# Patient Record
Sex: FEMALE | Race: WHITE | NOT HISPANIC OR LATINO | Employment: OTHER | RURAL
[De-identification: names, ages, dates, MRNs, and addresses within clinical notes are randomized per-mention and may not be internally consistent; named-entity substitution may affect disease eponyms.]

---

## 2020-08-05 ENCOUNTER — TRANSCRIBE ORDERS (OUTPATIENT)
Dept: PHYSICAL THERAPY | Facility: CLINIC | Age: 80
End: 2020-08-05

## 2020-08-05 DIAGNOSIS — M70.71 BURSITIS OF RIGHT HIP, UNSPECIFIED BURSA: Primary | ICD-10-CM

## 2020-08-12 ENCOUNTER — TREATMENT (OUTPATIENT)
Dept: PHYSICAL THERAPY | Facility: CLINIC | Age: 80
End: 2020-08-12

## 2020-08-12 DIAGNOSIS — M70.71 BURSITIS OF RIGHT HIP, UNSPECIFIED BURSA: Primary | ICD-10-CM

## 2020-08-12 DIAGNOSIS — M25.551 RIGHT HIP PAIN: ICD-10-CM

## 2020-08-12 PROCEDURE — 97110 THERAPEUTIC EXERCISES: CPT | Performed by: PHYSICAL THERAPIST

## 2020-08-12 PROCEDURE — 97162 PT EVAL MOD COMPLEX 30 MIN: CPT | Performed by: PHYSICAL THERAPIST

## 2020-08-12 NOTE — PROGRESS NOTES
Physical Therapy Initial Evaluation and Plan of Care      Patient: Katie Ayoub   : 1940  Diagnosis/ICD-10 Code:  Bursitis of right hip, unspecified bursa [M70.71]  Referring practitioner: Kandi Joshi NP  Date of Initial Visit: 2020  Today's Date: 2020  Patient seen for 1 sessions    Next MD appt: 2020.  Recertification: 2020          Subjective Questionnaire: LEFS: 32/80      Subjective Evaluation    History of Present Illness  Date of surgery: 2019.    Subjective comment: patient reports she had a HIRA in 2019. She reports she was doing good until about 2 months ago. She reprots her HIRA was an anterior approach. She reprots she avoids steps and getitng in out of the bath. She reports she is unable to sleep on her R side and here lately it is waking her up. She reports she gets twinges of pain in the R hip too.   Patient Occupation: Unemployed- Retired from [employer] Quality of life: good    Pain  Current pain ratin  At best pain ratin  At worst pain ratin  Location: R hip  Quality: discomfort (soreness)  Relieving factors: change in position  Aggravating factors: prolonged positioning and ambulation    Social Support  Lives in: one-story house  Lives with: spouse    Diagnostic Tests  X-ray: normal    Treatments  Previous treatment: physical therapy  Patient Goals  Patient goals for therapy: decreased pain  Patient goal: Patient wishes to be able to sleep on her R side.           Objective          Tenderness     Right Hip   Tenderness in the greater trochanter.     Neurological Testing     Sensation     Hip   Left Hip   Intact: light touch    Right Hip   Intact: light touch    Passive Range of Motion     Right Hip   Normal passive range of motion    Strength/Myotome Testing     Left Hip   Planes of Motion   Flexion: 4-  Abduction: 4  Adduction: 4    Right Hip   Planes of Motion   Flexion: 4  Abduction: 4  Adduction: 4    Left Knee   Flexion:  "4+  Extension: 4+    Right Knee   Flexion: 4+  Extension: 4+    Tests     Right Hip   Positive LANA.   Negative FADIR, modified Patricia, Patricia and piriformis.   Keaton: Negative.    Modified Keaton: Negative.     Right Hip Flexibility Comments:   Mild ITB tightness.    Ambulation   Weight-Bearing Status   Weight-Bearing Status (Left): weight-bearing as tolerated   Weight-Bearing Status (Right): weight-bearing as tolerated   Assistive device used: none    Ambulation: Level Surfaces   Ambulation without assistive device: independent    Ambulation: Stairs     Additional Stairs Ambulation Details  Not tested.    Observational Gait   Gait: asymmetric   Decreased walking speed and stride length.   Left foot contact pattern: heel to toe  Right foot contact pattern: heel to toe          Assessment & Plan     Assessment  Impairments: abnormal or restricted ROM, activity intolerance, impaired physical strength, lacks appropriate home exercise program and pain with function  Assessment details: Patient presents in a deconditioned state with complaints of R lateral hip pain and tightness. She is >18 months out from a R anterior HIRA. Her main goal is to lay on her side. Her order has dry needling on it but patient defers it at this time. Patient did well with all HEP exercises today. Patient was given written copies of HEP.  Prognosis: good  Prognosis details: Barriers to Rehab: Include significant or possible arthritic/degenerative changes that have occurred within the joint, The chronicity of this issue, The patient's obesity, The patient's generally deconditioned state.    Safety Issues: None noted.    Functional Limitations: lifting, walking, uncomfortable because of pain, moving in bed, sitting, standing and stooping  Goals  Plan Goals: Short Term Goals  1) Patient I with HEP and have additions/changes by next recertification.    2) Patient able to perform 20 Bridges with UE \"X\" with no increase in pain.    3) R hip flexion >= " 4/5.    4) Patient able to perform sit to/from stand with 1 UE A x20 reps, = WB B LEs.    5) B hip add >= 4+/5    6) Patient able to ambulate with good heel/toe gait cycle, = step/stride lengths with no assistive device >= 300' and no increase in pain.    Long Term Goals  1) B hip >= 4+/5 in all directions.    2) B Quads 5/5.    3) B hamstrings 5/5.    4)Minimal to no TTP at the R ITB and GTB.    5) Patient able to ambulate 1/8 mile non-antalgically with no significant gait deviations and no increase in pain.    6) Patient able to ambulate up/down 3 steps reciprocally 1 HRA for balance with no increase in pain x10.    7) I with final HEP      Plan  Therapy options: will be seen for skilled physical therapy services  Planned modality interventions: cryotherapy and high voltage pulsed current (pain management)  Planned therapy interventions: balance/weight-bearing training, dressing changes, flexibility, functional ROM exercises, gait training, home exercise program, IADL retraining, joint mobilization, manual therapy, postural training, soft tissue mobilization, strengthening, stretching, therapeutic activities and transfer training  Duration in visits: 10  Treatment plan discussed with: patient  Plan details: Progress overall ROM, strength, gait, function, and flexibility.      Other therapeutic activities and/or exercises will be prescribed depending on the patients progress or lack there of.     Visit Diagnoses:    ICD-10-CM ICD-9-CM   1. Bursitis of right hip, unspecified bursa M70.71 726.5   2. Right hip pain M25.551 719.45       Timed:  Manual Therapy:         mins  16187;  Therapeutic Exercise:    10     mins  76451;     Aquatic Ther Ex:         mins  19420;     Neuromuscular Maria L:        mins  27144;    Therapeutic Activity:          mins  72788;     Gait Training:           mins  79204;     Ultrasound:          mins  51028;    Paraffin:        mins  44656;  Electrical Stimulation:         mins  08103 (  );    Untimed:  Electrical Stimulation:         mins  54526 ( );  Mechanical Traction:         mins  98593;     Timed Treatment:   10   mins   Total Treatment:     35   mins    PT SIGNATURE: Kay Witt, REINA DPT, [facility]   DATE TREATMENT INITIATED: 8/12/2020    Initial Certification  Certification Period: 11/10/2020  I certify that the therapy services are furnished while this patient is under my care.  The services outlined above are required by this patient, and will be reviewed every 90 days.     PHYSICIAN: Kandi Joshi, NP      DATE:     Please sign and return via fax to  .. Thank you, [facility].

## 2020-08-13 ENCOUNTER — TREATMENT (OUTPATIENT)
Dept: PHYSICAL THERAPY | Facility: CLINIC | Age: 80
End: 2020-08-13

## 2020-08-13 DIAGNOSIS — M70.71 BURSITIS OF RIGHT HIP, UNSPECIFIED BURSA: Primary | ICD-10-CM

## 2020-08-13 DIAGNOSIS — M25.551 RIGHT HIP PAIN: ICD-10-CM

## 2020-08-13 PROCEDURE — 97110 THERAPEUTIC EXERCISES: CPT | Performed by: PHYSICAL THERAPIST

## 2020-08-13 NOTE — PROGRESS NOTES
"Physical Therapy Daily Progress Note    Patient: Katie Ayoub   : 1940  Diagnosis/ICD-10 Code:     Diagnosis Plan   1. Bursitis of right hip, unspecified bursa     2. Right hip pain       Referring practitioner: Kandi Joshi NP  Date of Initial Visit: Type: THERAPY  Noted: 2020  Today's Date: 2020  Patient seen for 2 sessions      PT Recheck Due: 2020  PT MD Visit: 2020       Katie Ayoub     Subjective Questionnaire:       Subjective Evaluation    History of Present Illness    Subjective comment: Pt reports she is a little sore from the exercises last visit but no pain to report.  States the soreness is just from doing nothing for so long.  Pain  Current pain ratin             Objective   See Exercise, Manual, and Modality Logs for complete treatment.       Assessment & Plan     Assessment  Assessment details: Pt with good effort and fair technique.  Requires frequent cues for technique and form with cues for slow and controlled motions.  No reports of pain with any exercise or movements today.  Fatigued with sit to stands.  Gait observation with limited heel strike and limited pelvic rotation.  Struggles with ITB stretch.  Reinforced form and no new additions to HEP at this time until comfortable with current HEP.      Goals  Plan Goals: Short Term Goals  1) Patient I with HEP and have additions/changes by next recertification.    2) Patient able to perform 20 Bridges with UE \"X\" with no increase in pain.    3) R hip flexion >= 4/5.    4) Patient able to perform sit to/from stand with 1 UE A x20 reps, = WB B LEs.    5) B hip add >= 4+/5    6) Patient able to ambulate with good heel/toe gait cycle, = step/stride lengths with no assistive device >= 300' and no increase in pain.    Long Term Goals  1) B hip >= 4+/5 in all directions.    2) B Quads 5/5.    3) B hamstrings 5/5.    4)Minimal to no TTP at the R ITB and GTB.    5) Patient able to ambulate 1/8 mile non-antalgically " with no significant gait deviations and no increase in pain.    6) Patient able to ambulate up/down 3 steps reciprocally 1 HRA for balance with no increase in pain x10.    7) I with final HEP    Plan  Plan details: Continue with strength and ROM.  Next visit add standing hip strengthening exercises if tolerated.  Review/modify ITB st if needed.        Progress per Plan of Care and Progress strengthening /stabilization /functional activity            Timed:  Manual Therapy:         mins  62721;  Therapeutic Exercise:    48     mins  67974;   Aquatic Therex :        mins  13899    Neuromuscular Maria L:        mins  16735;    Therapeutic Activity:          mins  08117;     Gait Training:           mins  35067;     Ultrasound:          mins  80023;    Electrical Stimulation:         mins  24653 ( );    Untimed:  Electrical Stimulation:         mins  36240 ( );  Mechanical Traction:         mins  36635;   Orthotic fit/train:         mins  90875    Timed Treatment:   48   mins   Total Treatment:     48   mins  Janey Watts PTA  Physical Therapist Assistant

## 2020-08-18 ENCOUNTER — TREATMENT (OUTPATIENT)
Dept: PHYSICAL THERAPY | Facility: CLINIC | Age: 80
End: 2020-08-18

## 2020-08-18 DIAGNOSIS — M70.71 BURSITIS OF RIGHT HIP, UNSPECIFIED BURSA: Primary | ICD-10-CM

## 2020-08-18 DIAGNOSIS — M25.551 RIGHT HIP PAIN: ICD-10-CM

## 2020-08-18 PROCEDURE — 97110 THERAPEUTIC EXERCISES: CPT | Performed by: PHYSICAL THERAPIST

## 2020-08-18 NOTE — PROGRESS NOTES
"   Physical Therapy Daily Progress Note      Patient: Katie Ayoub   : 1940  Referring practitioner: Kandi Joshi NP  Date of Initial Visit: Type: THERAPY  Noted: 2020  Today's Date: 2020  Patient seen for 3 sessions    Next MD appt: 2020.  Recertification: 2020        Katie Ayoub reports: she is unsure of any improvements yet.        Subjective Evaluation    Pain  No pain reported           Objective   See Exercise, Manual, and Modality Logs for complete treatment.       Assessment & Plan     Assessment  Assessment details: Patient still exhibits foot flat type gait with stiff waddling type gait with limited hip movement. She also ambulates with a wider AYAD on the L LE, R more in line with body. Still no changes t HEP. Reviewed ITB S and better instructed it for patient ot better understand for home. Patient reported she had not been doing the ITB S at home. No issues with new there ex. Continues to fatigue easily.     Goals  Plan Goals: Short Term Goals  1) Patient I with HEP and have additions/changes by next recertification.    2) Patient able to perform 20 Bridges with UE \"X\" with no increase in pain.    3) R hip flexion >= 4/5.    4) Patient able to perform sit to/from stand with 1 UE A x20 reps, = WB B LEs. (ongoing/progressing)    5) B hip add >= 4+/5    6) Patient able to ambulate with good heel/toe gait cycle, = step/stride lengths with no assistive device >= 300' and no increase in pain.    Long Term Goals  1) B hip >= 4+/5 in all directions.    2) B Quads 5/5.    3) B hamstrings 5/5.    4)Minimal to no TTP at the R ITB and GTB.    5) Patient able to ambulate 1/8 mile non-antalgically with no significant gait deviations and no increase in pain.    6) Patient able to ambulate up/down 3 steps reciprocally 1 HRA for balance with no increase in pain x10.    7) I with final HEP    Plan  Plan details: Add step up Fwd/Lat next session. Work on fixing/smoothing " gait.        Visit Diagnoses:    ICD-10-CM ICD-9-CM   1. Bursitis of right hip, unspecified bursa M70.71 726.5   2. Right hip pain M25.551 719.45                  Timed:  Manual Therapy:         mins  66909;  Therapeutic Exercise:    54     mins  15109;     Aquatic Ther Ex:         mins  00976;     Neuromuscular Maria L:        mins  37473;    Therapeutic Activity:          mins  98204;     Gait Training:           mins  29534;     Ultrasound:          mins  81624;    Paraffin:        mins  83477;  Electrical Stimulation:         mins  08516 ( );    Untimed:  Electrical Stimulation:         mins  55094 ( );  Mechanical Traction:         mins  43224;     Timed Treatment:   54   mins   Total Treatment:     54   mins    Kay Witt PT DPT, CSCS  Physical Therapist

## 2020-08-20 ENCOUNTER — TREATMENT (OUTPATIENT)
Dept: PHYSICAL THERAPY | Facility: CLINIC | Age: 80
End: 2020-08-20

## 2020-08-20 DIAGNOSIS — M70.71 BURSITIS OF RIGHT HIP, UNSPECIFIED BURSA: Primary | ICD-10-CM

## 2020-08-20 DIAGNOSIS — M25.551 RIGHT HIP PAIN: ICD-10-CM

## 2020-08-20 PROCEDURE — 97110 THERAPEUTIC EXERCISES: CPT | Performed by: PHYSICAL THERAPIST

## 2020-08-20 NOTE — PROGRESS NOTES
"   Physical Therapy Daily Progress Note      Patient: Katie Ayoub   : 1940  Referring practitioner: Kandi Joshi NP  Date of Initial Visit: Type: THERAPY  Noted: 2020  Today's Date: 2020  Patient seen for 4 sessions    Next MD appt: 2020.  Recertification: 2020        Katie Ayoub reports: unsure of imporovements      Subjective Evaluation    History of Present Illness    Subjective comment: Patient reprots she is sore in her legs.       Objective   See Exercise, Manual, and Modality Logs for complete treatment.       Assessment & Plan     Assessment  Assessment details: Patient arrived 15 min late today. Still requires cueing for proper technique with St. ITB S. Patient reports doing HEP, \"a little\". Did add some st. Ther ex to HEP. No complaints with new there ex other than fatigue.     Goals  Plan Goals: Short Term Goals  1) Patient I with HEP and have additions/changes by next recertification.    2) Patient able to perform 20 Bridges with UE \"X\" with no increase in pain.    3) R hip flexion >= 4/5.    4) Patient able to perform sit to/from stand with 1 UE A x20 reps, = WB B LEs. (ongoing/progressing)    5) B hip add >= 4+/5    6) Patient able to ambulate with good heel/toe gait cycle, = step/stride lengths with no assistive device >= 300' and no increase in pain.    Long Term Goals  1) B hip >= 4+/5 in all directions.    2) B Quads 5/5.    3) B hamstrings 5/5.    4)Minimal to no TTP at the R ITB and GTB.    5) Patient able to ambulate 1/8 mile non-antalgically with no significant gait deviations and no increase in pain.    6) Patient able to ambulate up/down 3 steps reciprocally 1 HRA for balance with no increase in pain x10.    7) I with final HEP    Plan  Plan details: Work on fixing/smoothing gait. Add lateral step up        Visit Diagnoses:    ICD-10-CM ICD-9-CM   1. Bursitis of right hip, unspecified bursa M70.71 726.5   2. Right hip pain M25.551 719.45       Progress " strengthening /stabilization /functional activity           Timed:  Manual Therapy:         mins  21822;  Therapeutic Exercise:    33     mins  11902;     Aquatic Ther Ex:         mins  35665;     Neuromuscular Maria L:        mins  81512;    Therapeutic Activity:          mins  69241;     Gait Training:           mins  10828;     Ultrasound:          mins  94115;    Paraffin:        mins  50205;  Electrical Stimulation:         mins  88143 ( );    Untimed:  Electrical Stimulation:         mins  50449 ( );  Mechanical Traction:         mins  55175;     Timed Treatment:   33   mins   Total Treatment:     33   mins    Kay Witt PT DPT, CSCS  Physical Therapist

## 2020-08-25 ENCOUNTER — TREATMENT (OUTPATIENT)
Dept: PHYSICAL THERAPY | Facility: CLINIC | Age: 80
End: 2020-08-25

## 2020-08-25 DIAGNOSIS — M70.71 BURSITIS OF RIGHT HIP, UNSPECIFIED BURSA: Primary | ICD-10-CM

## 2020-08-25 DIAGNOSIS — M25.551 RIGHT HIP PAIN: ICD-10-CM

## 2020-08-25 PROCEDURE — 97110 THERAPEUTIC EXERCISES: CPT | Performed by: PHYSICAL THERAPIST

## 2020-08-25 NOTE — PROGRESS NOTES
"   Physical Therapy Daily Progress Note      Patient: Katie Ayoub   : 1940  Referring practitioner: Kandi Joshi NP  Date of Initial Visit: Type: THERAPY  Noted: 2020  Today's Date: 2020  Patient seen for 5 sessions    Next MD appt: 2020.  Recertification: 2020        Katie Ayoub reports: \"cant tell\"        Subjective Evaluation    History of Present Illness    Subjective comment: pt states that she isnt huring this morning just sore through the lateral/posterior hipPain  Pain scale: \"sore\"           Objective          Ambulation     Comments   Waddling gait      See Exercise, Manual, and Modality Logs for complete treatment.       Assessment & Plan     Assessment  Assessment details: Pt did well with treatment-  No complaints of pain throughout. Pt only required 1-2 seated rest breaks. Pt displayed good effort throughout.    Goals  Plan Goals: Short Term Goals  1) Patient I with HEP and have additions/changes by next recertification.    2) Patient able to perform 20 Bridges with UE \"X\" with no increase in pain.    3) R hip flexion >= 4/5.    4) Patient able to perform sit to/from stand with 1 UE A x20 reps, = WB B LEs. (ongoing/progressing)    5) B hip add >= 4+/5    6) Patient able to ambulate with good heel/toe gait cycle, = step/stride lengths with no assistive device >= 300' and no increase in pain.    Long Term Goals  1) B hip >= 4+/5 in all directions.    2) B Quads 5/5.    3) B hamstrings 5/5.    4)Minimal to no TTP at the R ITB and GTB.    5) Patient able to ambulate 1/8 mile non-antalgically with no significant gait deviations and no increase in pain.    6) Patient able to ambulate up/down 3 steps reciprocally 1 HRA for balance with no increase in pain x10.    7) I with final HEP        Plan  Plan details: Lateral side stepping with tband above knees in // bars. See if pt can do HL/supine piriformis S        Visit Diagnoses:    ICD-10-CM ICD-9-CM   1. Bursitis of right " hip, unspecified bursa M70.71 726.5   2. Right hip pain M25.551 719.45       Progress per Plan of Care and Progress strengthening /stabilization /functional activity           Timed:  Manual Therapy:         mins  59851;  Therapeutic Exercise:    45     mins  76697;     Neuromuscular Maria L:        mins  09299;    Therapeutic Activity:          mins  79442;     Gait Training:           mins  17275;     Ultrasound:          mins  79523;    Electrical Stimulation:         mins  65206 ( );    Untimed:  Electrical Stimulation:         mins  72775 ( );  Mechanical Traction:         mins  09636;     Timed Treatment:   45   mins   Total Treatment:     45   mins  Kamini Guzman [facility]    Physical Therapist

## 2020-08-27 ENCOUNTER — TREATMENT (OUTPATIENT)
Dept: PHYSICAL THERAPY | Facility: CLINIC | Age: 80
End: 2020-08-27

## 2020-08-27 DIAGNOSIS — M70.71 BURSITIS OF RIGHT HIP, UNSPECIFIED BURSA: Primary | ICD-10-CM

## 2020-08-27 DIAGNOSIS — M25.551 RIGHT HIP PAIN: ICD-10-CM

## 2020-08-27 PROCEDURE — 97110 THERAPEUTIC EXERCISES: CPT | Performed by: PHYSICAL THERAPIST

## 2020-08-27 NOTE — PROGRESS NOTES
"Physical Therapy Daily Progress Note    Patient: Katie Ayoub   : 1940  Diagnosis/ICD-10 Code:     Diagnosis Plan   1. Bursitis of right hip, unspecified bursa     2. Right hip pain       Referring practitioner: Kandi Joshi NP  Date of Initial Visit: Type: THERAPY  Noted: 2020  Today's Date: 2020  Patient seen for 6 sessions      PT Recheck Due: 2020   PT MD Visit: 2020       Katie Ayoub reports: 50% improvement      Subjective Questionnaire:       Subjective Evaluation    History of Present Illness    Subjective comment: Pt offers no new issues or complaints.  States the hip is just sore sometimes but not painful.  Pain  Current pain ratin             Objective   See Exercise, Manual, and Modality Logs for complete treatment.       Assessment & Plan     Assessment  Assessment details: Pt requires cues for proper form with stretches and strengthening exercises.  Fatigues easily but good effort.  Good recall of supine HEP and stretches.  Pt provided with written instructions for adding standing strengthening.  No complaints of pain during session.      Goals  Plan Goals: Short Term Goals  1) Patient I with HEP and have additions/changes by next recertification.    2) Patient able to perform 20 Bridges with UE \"X\" with no increase in pain.    3) R hip flexion >= 4/5.    4) Patient able to perform sit to/from stand with 1 UE A x20 reps, = WB B LEs. (ongoing/progressing)    5) B hip add >= 4+/5    6) Patient able to ambulate with good heel/toe gait cycle, = step/stride lengths with no assistive device >= 300' and no increase in pain.    Long Term Goals  1) B hip >= 4+/5 in all directions.    2) B Quads 5/5.    3) B hamstrings 5/5.    4)Minimal to no TTP at the R ITB and GTB.    5) Patient able to ambulate 1/8 mile non-antalgically with no significant gait deviations and no increase in pain.    6) Patient able to ambulate up/down 3 steps reciprocally 1 HRA for balance with no " increase in pain x10.    7) I with final HEP    Plan  Plan details: Continue with strengthening and flexibility. Next visit add hip ABD with lateral step ups.        Progress per Plan of Care and Progress strengthening /stabilization /functional activity            Timed:  Manual Therapy:         mins  51362;  Therapeutic Exercise:    45     mins  58438;   Aquatic Therex :        mins  43416    Neuromuscular Maria L:        mins  41237;    Therapeutic Activity:          mins  01454;     Gait Training:           mins  99922;     Ultrasound:          mins  42242;    Electrical Stimulation:         mins  09726 ( );    Untimed:  Electrical Stimulation:         mins  22796 ( );  Mechanical Traction:         mins  29831;   Orthotic fit/train:         mins  70956    Timed Treatment:   45   mins   Total Treatment:     45   mins  Janey Watts PTA  Physical Therapist Assistant

## 2020-09-01 ENCOUNTER — TREATMENT (OUTPATIENT)
Dept: PHYSICAL THERAPY | Facility: CLINIC | Age: 80
End: 2020-09-01

## 2020-09-01 DIAGNOSIS — M70.71 BURSITIS OF RIGHT HIP, UNSPECIFIED BURSA: Primary | ICD-10-CM

## 2020-09-01 DIAGNOSIS — M25.551 RIGHT HIP PAIN: ICD-10-CM

## 2020-09-01 PROCEDURE — 97110 THERAPEUTIC EXERCISES: CPT | Performed by: PHYSICAL THERAPIST

## 2020-09-01 NOTE — PROGRESS NOTES
"Re-Assessment / Re-Certification      Patient: Katie Ayoub   : 1940  Diagnosis/ICD-10 Code:  Bursitis of right hip, unspecified bursa [M70.71]  Referring practitioner: Kandi Joshi NP  Date of Initial Visit: Type: THERAPY  Noted: 2020  Today's Date: 2020  Patient seen for 7 sessions    Next MD appt: 2020.  Recertification: 2020     Subjective:   Katie Ayoub reports: \"some days yes and some no\"  Subjective Questionnaire: LEFS: 40  Clinical Progress: improved  Home Program Compliance: Yes  Treatment has included: therapeutic exercise, therapeutic activity and gait training    Subjective Evaluation    History of Present Illness    Subjective comment: Patient reports she still has a deep pain in the R hip. She reports she was able to lay on that hip without issues last week.Pain  Current pain ratin         Objective          Palpation     Right Tenderness of the piriformis and TFL.     Tenderness     Right Hip   Tenderness in the greater trochanter.     Neurological Testing     Sensation     Hip   Left Hip   Intact: light touch    Right Hip   Intact: light touch    Passive Range of Motion     Right Hip   Normal passive range of motion    Strength/Myotome Testing     Left Hip   Planes of Motion   Flexion: 4  Abduction: 4+  Adduction: 4    Right Hip   Planes of Motion   Flexion: 4+  Abduction: 4+  Adduction: 4    Left Knee   Flexion: 5  Extension: 4+    Right Knee   Flexion: 5  Extension: 4+    Tests     Right Hip   Positive LANA.   Negative FADIR, modified Patricia, Patricia and piriformis.   Keaton: Negative.    Modified Keaton: Negative.     Left Hip Flexibility Comments:   Mild piriformis tightness    Right Hip Flexibility Comments:   Mild ITB tightness.  Moderate piriformis tightness    Ambulation   Weight-Bearing Status   Weight-Bearing Status (Left): weight-bearing as tolerated   Weight-Bearing Status (Right): weight-bearing as tolerated    Assistive device used: " "none    Ambulation: Level Surfaces   Ambulation without assistive device: independent    Ambulation: Stairs   Ascend stairs: independent  Pattern: non-reciprocal  Railings: two rails  Descend stairs: independent  Pattern: non-reciprocal  Railings: two rails    Observational Gait   Gait: asymmetric   Decreased walking speed and stride length.   Left foot contact pattern: heel to toe  Right foot contact pattern: heel to toe      Assessment & Plan     Assessment  Impairments: abnormal gait, activity intolerance, impaired physical strength and pain with function  Assessment details: Patient still erquires some cueing for HEP exercises. Has significant tightness on R side in piriformis and still exhibits overall weakness and decrease endurance. Patient is progressing towards goals, but still not there yet.  Prognosis: good  Prognosis details: Barriers to Rehab: Include significant or possible arthritic/degenerative changes that have occurred within the joint, The chronicity of this issue, The patient's obesity, The patient's generally deconditioned state.    Safety Issues: None noted.    Functional Limitations: lifting, walking, uncomfortable because of pain, moving in bed, sitting, standing and stooping  Goals  Plan Goals: Short Term Goals  1) Patient I with HEP and have additions/changes by next recertification. (partially met/ongoing)    2) Patient able to perform 20 Bridges with UE \"X\" with no increase in pain. (met)    3) R hip flexion >= 4/5. (met)    4) Patient able to perform sit to/from stand with 1 UE A x20 reps, = WB B LEs. (Partially met/ongoing/progressing)    5) B hip add >= 4+/5 (ongoing)    6) Patient able to ambulate with good heel/toe gait cycle, = step/stride lengths with no assistive device >= 300' and no increase in pain. (partially met)    Long Term Goals  1) B hip >= 4+/5 in all directions.    2) B Quads 5/5.    3) B hamstrings 5/5. (met)    4) Minimal to no TTP at the R ITB and GTB. (partially " met/ongoing)    5) Patient able to ambulate 1/8 mile non-antalgically with no significant gait deviations and no increase in pain.    6) Patient able to ambulate up/down 3 steps reciprocally 1 HRA for balance with no increase in pain x10.    7) I with final HEP    Plan  Therapy options: will be seen for skilled physical therapy services  Planned modality interventions: cryotherapy and high voltage pulsed current (pain management)  Planned therapy interventions: balance/weight-bearing training, dressing changes, flexibility, functional ROM exercises, gait training, home exercise program, IADL retraining, joint mobilization, manual therapy, postural training, soft tissue mobilization, strengthening, stretching, therapeutic activities and transfer training  Duration in visits: 10  Treatment plan discussed with: patient  Plan details: Continue with strengthening and flexibility. Weakness and flexibility is main issue       Other therapeutic activities and/or exercises will be prescribed depending on the patients progress or lack there of.     Visit Diagnoses:    ICD-10-CM ICD-9-CM   1. Bursitis of right hip, unspecified bursa M70.71 726.5   2. Right hip pain M25.551 719.45       Progress toward previous goals: Partially Met        Recommendations: Continue as planned  Timeframe: 1 month  Prognosis to achieve goals: fair    PT Signature: Kay Witt, PT DPT, CSCS      Based upon review of the patient's progress and continued therapy plan, it is my medical opinion that Katie Ayoub should continue physical therapy treatment at [facility]  [address] [place] [address] [phone].    Signature: __________________________________  Kandi Joshi NP    Timed:  Manual Therapy:         mins  26784;  Therapeutic Exercise:    39     mins  05552;     Aquatic Ther Ex:         mins  28153;     Neuromuscular Maria L:        mins  10044;    Therapeutic Activity:           mins  16578;     Gait Training:           mins  41316;     Ultrasound:          mins  36634;    Paraffin:        mins  65317;  Electrical Stimulation:         mins  62060 ( );    Untimed:  Electrical Stimulation:         mins  20112 ( );  Mechanical Traction:         mins  41211;     Timed Treatment:   39   mins   Total Treatment:     39   mins

## 2020-09-04 ENCOUNTER — TREATMENT (OUTPATIENT)
Dept: PHYSICAL THERAPY | Facility: CLINIC | Age: 80
End: 2020-09-04

## 2020-09-04 DIAGNOSIS — M25.551 RIGHT HIP PAIN: ICD-10-CM

## 2020-09-04 DIAGNOSIS — M70.71 BURSITIS OF RIGHT HIP, UNSPECIFIED BURSA: Primary | ICD-10-CM

## 2020-09-04 PROCEDURE — 97110 THERAPEUTIC EXERCISES: CPT | Performed by: PHYSICAL THERAPIST

## 2020-09-04 NOTE — PROGRESS NOTES
"   Physical Therapy Daily Progress Note      Patient: Katie Ayoub   : 1940  Referring practitioner: Kandi Joshi NP  Date of Initial Visit: Type: THERAPY  Noted: 2020  Today's Date: 2020  Patient seen for 8 sessions    Recert due:  20  MD followup:  20     Katie Ayoub reports:   Subjective Questionnaire:       Subjective  Pt reports continued tenderness to touch and with R sidelying.  Walking doesn't seem to affect it, but doesn't walk long distances due to knee OA. Pre RX pain 0/10.     Objective   Amb with slightly antalgic gait    See Exercise, Manual, and Modality Logs for complete treatment.       Assessment & Plan     Assessment  Assessment details: Pt reported no pain with therex.  Did have discomfort rolling onto R side for supine to sit from mat table.  Making gradual progress toward goals.     Goals  Plan Goals: Plan Goals: Short Term Goals  1) Patient I with HEP and have additions/changes by next recertification. (partially met/ongoing)    2) Patient able to perform 20 Bridges with UE \"X\" with no increase in pain. (met)    3) R hip flexion >= 4/5. (met)    4) Patient able to perform sit to/from stand with 1 UE A x20 reps, = WB B LEs. (Partially met/ongoing/progressing)    5) B hip add >= 4+/5 (ongoing)    6) Patient able to ambulate with good heel/toe gait cycle, = step/stride lengths with no assistive device >= 300' and no increase in pain. (partially met)    Long Term Goals  1) B hip >= 4+/5 in all directions.    2) B Quads 5/5.    3) B hamstrings 5/5. (met)    4) Minimal to no TTP at the R ITB and GTB. (partially met/ongoing)    5) Patient able to ambulate 1/8 mile non-antalgically with no significant gait deviations and no increase in pain.    6) Patient able to ambulate up/down 3 steps reciprocally 1 HRA for balance with no increase in pain x10.    7) I with final HEP    Plan  Duration in visits: 10  Plan details: Forward step over in preparation for reciprocal " stairs.        Visit Diagnoses:    ICD-10-CM ICD-9-CM   1. Bursitis of right hip, unspecified bursa M70.71 726.5   2. Right hip pain M25.551 719.45                  Timed:  Manual Therapy:         mins  07592;  Therapeutic Exercise:  45       mins  71158;     Neuromuscular Maria L:        mins  54227;    Therapeutic Activity:          mins  81195;     Gait Training:           mins  00080;     Ultrasound:          mins  59754;    Electrical Stimulation:         mins  75474 ( );    Untimed:  Electrical Stimulation:         mins  82228 ( );  Mechanical Traction:         mins  52413;     Timed Treatment:  45   mins   Total Treatment:     45   mins  Adore Colon PTA  Physical Therapist Assistant

## 2020-09-09 ENCOUNTER — TREATMENT (OUTPATIENT)
Dept: PHYSICAL THERAPY | Facility: CLINIC | Age: 80
End: 2020-09-09

## 2020-09-09 DIAGNOSIS — M70.71 BURSITIS OF RIGHT HIP, UNSPECIFIED BURSA: Primary | ICD-10-CM

## 2020-09-09 DIAGNOSIS — M25.551 RIGHT HIP PAIN: ICD-10-CM

## 2020-09-09 PROCEDURE — 97110 THERAPEUTIC EXERCISES: CPT | Performed by: PHYSICAL THERAPIST

## 2020-09-09 NOTE — PROGRESS NOTES
"Physical Therapy Daily Progress Note    Patient: Katie Ayoub   : 1940  Diagnosis/ICD-10 Code:     Diagnosis Plan   1. Bursitis of right hip, unspecified bursa     2. Right hip pain       Referring practitioner: Kandi Joshi NP  Date of Initial Visit: Type: THERAPY  Noted: 2020  Today's Date: 2020  Patient seen for 9 sessions      PT Recheck Due: 2020  PT MD Visit: 2020       Katie Ayoub          Subjective Evaluation    History of Present Illness    Subjective comment: just a little ache. has been a long day. reports that they have been at pain management as well as her husbands PT evaluation visit before her therapy visit today. Pain  Current pain ratin             Objective   See Exercise, Manual, and Modality Logs for complete treatment.       Assessment & Plan     Assessment  Assessment details: Decreased treatment time secondary to patient tired from long day and patient's  waiting on her. Patient able to complete reciprocal stairs today with minimal knee complaints and use of bilateral UE on handrails. Good effort.     Goals  Plan Goals: Short Term Goals  1) Patient I with HEP and have additions/changes by next recertification. (partially met/ongoing)    2) Patient able to perform 20 Bridges with UE \"X\" with no increase in pain. (met)    3) R hip flexion >= 4/5. (met)    4) Patient able to perform sit to/from stand with 1 UE A x20 reps, = WB B LEs. (Partially met/ongoing/progressing)    5) B hip add >= 4+/5 (ongoing)    6) Patient able to ambulate with good heel/toe gait cycle, = step/stride lengths with no assistive device >= 300' and no increase in pain. (partially met)    Long Term Goals  1) B hip >= 4+/5 in all directions.    2) B Quads 5/5.    3) B hamstrings 5/5. (met)    4) Minimal to no TTP at the R ITB and GTB. (partially met/ongoing)    5) Patient able to ambulate 1/8 mile non-antalgically with no significant gait deviations and no increase in " pain.    6) Patient able to ambulate up/down 3 steps reciprocally 1 HRA for balance with no increase in pain x10.    7) I with final HEP    Plan  Plan details: Track walk next visit      Progress per Plan of Care and Progress strengthening /stabilization /functional activity            Timed:  Manual Therapy:         mins  34608;  Therapeutic Exercise:    35     mins  07914;   Aquatic Therex :        mins  39425    Neuromuscular Maria L:        mins  54040;    Therapeutic Activity:          mins  71752;     Gait Training:           mins  15804;     Ultrasound:          mins  15286;    Electrical Stimulation:         mins  61525 ( );    Untimed:  Electrical Stimulation:         mins  84301 ( );  Mechanical Traction:         mins  09925;   Orthotic fit/train:         mins  82081    Timed Treatment:   35   mins   Total Treatment:     35   mins  Joanna Shannon PTA  Physical Therapist Assistant

## 2020-09-22 ENCOUNTER — TELEPHONE (OUTPATIENT)
Dept: PHYSICAL THERAPY | Facility: CLINIC | Age: 80
End: 2020-09-22

## 2020-09-22 DIAGNOSIS — M25.551 RIGHT HIP PAIN: ICD-10-CM

## 2020-09-22 DIAGNOSIS — M70.71 BURSITIS OF RIGHT HIP, UNSPECIFIED BURSA: Primary | ICD-10-CM

## 2020-09-22 NOTE — TELEPHONE ENCOUNTER
Patient called to report her  was in the hospital and had a stroke. She report he is now released but needs home health and can't be alone. She reports she needs to put her therapy on hold for now to care for him. We verbally reviewed her HEP and I instructed her to break her exercises up and spread them out throughout the day. I also informed her her chart could stay open for 60 days from her last recheck.

## 2021-04-14 ENCOUNTER — LAB (OUTPATIENT)
Dept: LAB | Facility: HOSPITAL | Age: 81
End: 2021-04-14

## 2021-04-14 DIAGNOSIS — R07.9 CHEST PAIN, UNSPECIFIED TYPE: ICD-10-CM

## 2021-04-14 DIAGNOSIS — R07.9 CHEST PAIN, UNSPECIFIED TYPE: Primary | ICD-10-CM

## 2021-04-14 LAB
D-DIMER, QUANTITATIVE (MAD,POW, STR): 498 NG/ML (FEU) (ref 0–470)
TROPONIN T SERPL-MCNC: <0.01 NG/ML (ref 0–0.03)

## 2021-04-14 PROCEDURE — 84484 ASSAY OF TROPONIN QUANT: CPT

## 2021-04-14 PROCEDURE — 85379 FIBRIN DEGRADATION QUANT: CPT

## 2021-04-14 PROCEDURE — 36415 COLL VENOUS BLD VENIPUNCTURE: CPT

## 2021-04-15 DIAGNOSIS — R07.9 CHEST PAIN, UNSPECIFIED TYPE: Primary | ICD-10-CM

## 2021-05-06 ENCOUNTER — HOSPITAL ENCOUNTER (OUTPATIENT)
Dept: NUCLEAR MEDICINE | Facility: HOSPITAL | Age: 81
Discharge: HOME | End: 2021-05-06

## 2021-05-06 ENCOUNTER — HOSPITAL ENCOUNTER (OUTPATIENT)
Dept: CARDIOLOGY | Facility: HOSPITAL | Age: 81
Discharge: HOME | End: 2021-05-06

## 2021-05-06 DIAGNOSIS — R07.9 CHEST PAIN, UNSPECIFIED TYPE: ICD-10-CM

## 2021-05-06 PROCEDURE — 0 TECHNETIUM SESTAMIBI: Performed by: INTERNAL MEDICINE

## 2021-05-06 PROCEDURE — 93017 CV STRESS TEST TRACING ONLY: CPT

## 2021-05-06 PROCEDURE — 25010000002 REGADENOSON 0.4 MG/5ML SOLUTION: Performed by: INTERNAL MEDICINE

## 2021-05-06 PROCEDURE — A9500 TC99M SESTAMIBI: HCPCS | Performed by: INTERNAL MEDICINE

## 2021-05-06 PROCEDURE — 78452 HT MUSCLE IMAGE SPECT MULT: CPT

## 2021-05-06 RX ORDER — CELECOXIB 200 MG/1
200 CAPSULE ORAL DAILY
COMMUNITY

## 2021-05-06 RX ORDER — DIAZEPAM 2 MG/1
2 TABLET ORAL EVERY 6 HOURS PRN
COMMUNITY

## 2021-05-06 RX ORDER — FUROSEMIDE 20 MG/1
20 TABLET ORAL DAILY
COMMUNITY

## 2021-05-06 RX ORDER — RALOXIFENE HYDROCHLORIDE 60 MG/1
60 TABLET, FILM COATED ORAL DAILY
COMMUNITY

## 2021-05-06 RX ORDER — SODIUM CHLORIDE 0.9 % (FLUSH) 0.9 %
10 SYRINGE (ML) INJECTION ONCE
Status: COMPLETED | OUTPATIENT
Start: 2021-05-06 | End: 2021-05-06

## 2021-05-06 RX ORDER — CARVEDILOL 12.5 MG/1
25 TABLET ORAL 2 TIMES DAILY WITH MEALS
COMMUNITY

## 2021-05-06 RX ADMIN — TECHNETIUM TC 99M SESTAMIBI 1 DOSE: 1 INJECTION INTRAVENOUS at 10:39

## 2021-05-06 RX ADMIN — SODIUM CHLORIDE, PRESERVATIVE FREE 10 ML: 5 INJECTION INTRAVENOUS at 10:38

## 2021-05-06 RX ADMIN — REGADENOSON 0.4 MG: 0.08 INJECTION, SOLUTION INTRAVENOUS at 10:38

## 2021-05-06 RX ADMIN — TECHNETIUM TC 99M SESTAMIBI 1 DOSE: 1 INJECTION INTRAVENOUS at 08:51

## 2021-06-06 LAB
BH CV REST NUCLEAR ISOTOPE DOSE: 11 MCI
BH CV STRESS BP STAGE 1: NORMAL
BH CV STRESS COMMENTS STAGE 1: NORMAL
BH CV STRESS DOSE REGADENOSON STAGE 1: 0.4
BH CV STRESS DURATION MIN STAGE 1: 0
BH CV STRESS DURATION SEC STAGE 1: 10
BH CV STRESS HR STAGE 1: 55
BH CV STRESS NUCLEAR ISOTOPE DOSE: 33.9 MCI
BH CV STRESS PROTOCOL 1: NORMAL
BH CV STRESS RECOVERY BP: NORMAL MMHG
BH CV STRESS RECOVERY HR: 71 BPM
BH CV STRESS STAGE 1: 1
LV EF NUC BP: 69 %
MAXIMAL PREDICTED HEART RATE: 142 BPM
PERCENT MAX PREDICTED HR: 56.34 %
STRESS BASELINE BP: NORMAL MMHG
STRESS BASELINE HR: 54 BPM
STRESS PERCENT HR: 66 %
STRESS POST ESTIMATED WORKLOAD: 1 METS
STRESS POST PEAK BP: NORMAL MMHG
STRESS POST PEAK HR: 80 BPM
STRESS TARGET HR: 121 BPM

## 2023-01-19 ENCOUNTER — PREP FOR SURGERY (OUTPATIENT)
Dept: OTHER | Facility: HOSPITAL | Age: 83
End: 2023-01-19
Payer: COMMERCIAL

## 2023-01-19 DIAGNOSIS — I48.91 ATRIAL FIBRILLATION, UNSPECIFIED TYPE: Primary | ICD-10-CM

## 2023-01-20 ENCOUNTER — HOSPITAL ENCOUNTER (OUTPATIENT)
Dept: CARDIOLOGY | Facility: HOSPITAL | Age: 83
Discharge: HOME | End: 2023-01-20
Admitting: INTERNAL MEDICINE
Payer: MEDICARE

## 2023-01-20 VITALS
OXYGEN SATURATION: 95 % | HEART RATE: 82 BPM | BODY MASS INDEX: 41.02 KG/M2 | DIASTOLIC BLOOD PRESSURE: 91 MMHG | RESPIRATION RATE: 18 BRPM | SYSTOLIC BLOOD PRESSURE: 177 MMHG | TEMPERATURE: 97 F | HEIGHT: 63 IN | WEIGHT: 231.48 LBS

## 2023-01-20 DIAGNOSIS — I48.91 ATRIAL FIBRILLATION, UNSPECIFIED TYPE: ICD-10-CM

## 2023-01-20 LAB
ALBUMIN SERPL-MCNC: 4 G/DL (ref 3.5–5.2)
ALBUMIN/GLOB SERPL: 1.5 G/DL
ALP SERPL-CCNC: 67 U/L (ref 39–117)
ALT SERPL W P-5'-P-CCNC: 13 U/L (ref 1–33)
ANION GAP SERPL CALCULATED.3IONS-SCNC: 9 MMOL/L (ref 5–15)
APTT PPP: 23.6 SECONDS (ref 20–40.3)
AST SERPL-CCNC: 15 U/L (ref 1–32)
BILIRUB SERPL-MCNC: 0.7 MG/DL (ref 0–1.2)
BUN SERPL-MCNC: 27 MG/DL (ref 8–23)
BUN/CREAT SERPL: 28.1 (ref 7–25)
CALCIUM SPEC-SCNC: 8.9 MG/DL (ref 8.6–10.5)
CHLORIDE SERPL-SCNC: 102 MMOL/L (ref 98–107)
CO2 SERPL-SCNC: 28 MMOL/L (ref 22–29)
CREAT SERPL-MCNC: 0.96 MG/DL (ref 0.57–1)
DEPRECATED RDW RBC AUTO: 44.2 FL (ref 37–54)
EGFRCR SERPLBLD CKD-EPI 2021: 59.9 ML/MIN/1.73
ERYTHROCYTE [DISTWIDTH] IN BLOOD BY AUTOMATED COUNT: 12.8 % (ref 12.3–15.4)
GLOBULIN UR ELPH-MCNC: 2.6 GM/DL
GLUCOSE SERPL-MCNC: 101 MG/DL (ref 65–99)
HCT VFR BLD AUTO: 38.4 % (ref 34–46.6)
HGB BLD-MCNC: 12.6 G/DL (ref 12–15.9)
MCH RBC QN AUTO: 30.8 PG (ref 26.6–33)
MCHC RBC AUTO-ENTMCNC: 32.8 G/DL (ref 31.5–35.7)
MCV RBC AUTO: 93.9 FL (ref 79–97)
PLATELET # BLD AUTO: 182 10*3/MM3 (ref 140–450)
PMV BLD AUTO: 10.1 FL (ref 6–12)
POTASSIUM SERPL-SCNC: 4.1 MMOL/L (ref 3.5–5.2)
PROT SERPL-MCNC: 6.6 G/DL (ref 6–8.5)
RBC # BLD AUTO: 4.09 10*6/MM3 (ref 3.77–5.28)
SODIUM SERPL-SCNC: 139 MMOL/L (ref 136–145)
WBC NRBC COR # BLD: 3.88 10*3/MM3 (ref 3.4–10.8)

## 2023-01-20 PROCEDURE — 80053 COMPREHEN METABOLIC PANEL: CPT | Performed by: INTERNAL MEDICINE

## 2023-01-20 PROCEDURE — 92960 CARDIOVERSION ELECTRIC EXT: CPT

## 2023-01-20 PROCEDURE — 85730 THROMBOPLASTIN TIME PARTIAL: CPT | Performed by: INTERNAL MEDICINE

## 2023-01-20 PROCEDURE — 85027 COMPLETE CBC AUTOMATED: CPT | Performed by: INTERNAL MEDICINE

## 2023-01-20 RX ORDER — SODIUM CHLORIDE 9 MG/ML
50 INJECTION, SOLUTION INTRAVENOUS CONTINUOUS
Status: DISCONTINUED | OUTPATIENT
Start: 2023-01-20 | End: 2023-01-21 | Stop reason: HOSPADM

## 2023-01-20 RX ORDER — ASPIRIN 81 MG/1
81 TABLET ORAL DAILY
COMMUNITY

## 2023-01-20 RX ORDER — RIVAROXABAN 20 MG/1
20 TABLET, FILM COATED ORAL NIGHTLY
COMMUNITY
Start: 2022-12-20

## 2023-01-20 RX ORDER — MAGNESIUM OXIDE 400 MG/1
400 TABLET ORAL 2 TIMES DAILY
COMMUNITY

## 2023-01-20 RX ORDER — AMIODARONE HYDROCHLORIDE 200 MG/1
200 TABLET ORAL 2 TIMES DAILY
COMMUNITY
Start: 2023-01-16

## 2023-01-20 RX ADMIN — SODIUM CHLORIDE 50 ML/HR: 9 INJECTION, SOLUTION INTRAVENOUS at 08:34

## 2023-01-20 NOTE — PROGRESS NOTES
Katie Ayoub is a 82 y.o. female     Height of 5 feet 4 inches weight of 226 pounds with a body mass index of 38 with a past medical history significant for arterial hypertension, hypertensive heart disease, chronic fatigue, osteoporosis, obesity with a body mass index of 38, arthritis and family history for coronary artery disease.      Patient has been in atrial fibrillation and anticoagulation with Xarelto.  Patient has been recommended to undergo electrical conversion.  Benefit treatment option for the electrocardioversion were discussed with the patient and informed consent was obtained.  Patient partners: #2 patient is extrication #2.  Risk for minimal sedation was also addressed with the patient.  Plan was to proceed with electrical cardioversion.

## 2023-01-20 NOTE — PROGRESS NOTES
Patient was scheduled to undergo electrical cardioversion.  Patient had not been taking Xarelto.  Xarelto patient electrical cardioversion would be canceled.  Patient has been recommended to restart anticoagulation with Xarelto and would reschedule electrical cardioversion on next Friday.    Patient and family was informed.

## 2023-01-20 NOTE — H&P
Cardiology History and Physical Note        Patient Name: Katie Ayoub  Age/Sex: 82 y.o. female  : 1940  MRN: [id number]    Date of Admission : 2023    Primary care Physician: Marcellus Feliz MD    Reason for Admission: Palpitation and shortness of breath electrical cardioversion      Subjective:       Chief Complaint: Palpitation shortness of breath    History of Present Illness:  Katie Ayoub is a 82 y.o. female    Height of 5 feet 4 inches weight of 226 pounds with a body mass index of 38 with a past medical history significant for arterial hypertension, hypertensive heart disease, chronic fatigue, osteoporosis, obesity with a body mass index of 38, arthritis and family history for coronary artery disease.     Patient has had atrial fibrillation.  Patient was added on amiodarone and anticoagulation with Xarelto.  Patient had undergone a myocardial perfusion Lexiscan Cardiolite stress test was negative for stress-induced ischemia.     Patient at the present time has been recommended electrocardioversion.  Risk-benefit treatment after the electrical cardioversion were discussed the patient     Patient 10 point review of system except for stated in the history of present illness and negative.        Concurrent  Medical History:  1.  Abnormal resting electrocardiogram with inferolateral ST-T wave changes.  2.  Atypical chest pain with symptoms of palpitation chronic fatigue and symptoms of dizziness.  3.  Arterial hypertension.  4.  Hypertensive heart disease.  5.  Osteoporosis.  6.  Obesity with a body mass index of 38.  7.  Arthritis.        Past Surgical History:  1.  Cholecystectomy.  2.  Tubal ligation.  3.  Right total hip arthroplasty.  4.  Hemorrhoidectomy.  5.  Colonoscopy.        Family History: Family history significant for brother who has coronary artery disease.        Social History: No history of tobacco alcohol intake.        Cardiac Risk factor:   1.   Postmenopausal.  2.  Obesity.  3.  Arterial hypertension.               Allergies:  No Known Allergies    Home Medication::  Prior to Admission medications    Medication Sig Start Date End Date Taking? Authorizing Provider   carvedilol (COREG) 12.5 MG tablet Take 12.5 mg by mouth 2 (Two) Times a Day With Meals.    Enedina Castillo MD   celecoxib (CeleBREX) 200 MG capsule Take 200 mg by mouth Daily.    Enedina Castillo MD   diazePAM (VALIUM) 2 MG tablet Take 2 mg by mouth Every 6 (Six) Hours As Needed for Anxiety.    Enedina Castillo MD   furosemide (LASIX) 20 MG tablet Take 20 mg by mouth Daily.    Enedina Castillo MD   raloxifene (EVISTA) 60 MG tablet Take 60 mg by mouth Daily.    Enedina Castillo MD         Review of Systems   Constitutional: Negative for chills, fever and unexpected weight change.   HENT: Negative for hearing loss and nosebleeds.    Eyes: Negative for visual disturbance.   Respiratory: Negative for cough, chest tightness, shortness of breath and wheezing.    Cardiovascular: Positive for palpitations. Negative for chest pain and leg swelling.   Gastrointestinal: Negative for abdominal pain, blood in stool, constipation, diarrhea, nausea and vomiting.   Endocrine: Negative for cold intolerance, heat intolerance, polydipsia, polyphagia and polyuria.   Genitourinary: Negative for hematuria.   Musculoskeletal: Negative for joint swelling, myalgias and neck pain.   Skin: Negative for color change, rash and wound.   Neurological: Negative for dizziness, seizures, syncope, light-headedness, numbness and headaches.   Hematological: Does not bruise/bleed easily.         Objective:     There were no vitals taken for this visit.  160/100 pulse of 69 respiration of 18 oxygen saturation of 97%.  Constitutional:       Appearance: Well-developed.   Eyes:      General: No scleral icterus.     Conjunctiva/sclera: Conjunctivae normal.      Pupils: Pupils are equal, round, and reactive to  light.   HENT:      Head: Normocephalic and atraumatic.      Left Ear: External ear normal.      Nose: Nose normal.   Neck:      Thyroid: No thyromegaly.      Vascular: No JVD.      Trachea: No tracheal deviation.      Lymphadenopathy: No cervical adenopathy.   Pulmonary:      Breath sounds: Normal breath sounds. No stridor.   Cardiovascular:      Normal rate. Regular rhythm.   Abdominal:      General: Bowel sounds are normal.   Musculoskeletal: Normal range of motion.      Cervical back: Normal range of motion and neck supple. Skin:     General: Skin is warm and dry.   Neurological:      Mental Status: Alert and oriented to person, place, and time.      Deep Tendon Reflexes: Reflexes are normal and symmetric.   Psychiatric:         Behavior: Behavior normal.         Thought Content: Thought content normal.         Judgment: Judgment normal.         Lab Review:           Invalid input(s): LABALBUSHERMAN                                    EKG:   ECG/EMG Results (last 24 hours)     ** No results found for the last 24 hours. **          Imaging:  Imaging Results (Last 24 Hours)     ** No results found for the last 24 hours. **          I personally viewed and interpreted the patient's EKG/Telemetry data.    Assessment:       1.  Atrial fibrillation.  2.  Abnormal resting electrocardiogram with inferolateral ST-T wave changes suggestive of ischemia.  3.  Arterial hypertension.  4.  Hypertensive heart disease.  5.  Obesity.  6.  Arthritis.      Plan:   1.  Atrial fibrillation.  Patient has been in atrial fibrillation and anticoagulation with Xarelto.  Patient has been recommended to undergo electrical conversion.  Benefit treatment option for the electrocardioversion were discussed with the patient and informed consent was obtained.  Patient partners: #2 patient is extrication #2.  Risk for minimal sedation was also discussed with the patient.  Plan was to proceed with electrical cardioversion.        2.  Shortness of  breath.  Patient has been counseled to decrease her salt intake.  Patient would be continued on the present dose of the carvedilol 12.5 mg twice a day and would be started on a low-dose of Lasix 20 mg daily.      3.  Arterial hypertension.  Patient blood pressure has been labile and elevated.  Patient has been counseled to decrease her salt intake and to have frequent blood pressure evaluation at home.     4.  Patient had undergone myocardial perfusion Lexiscan cardiac stress test with did not reveal evidence of any stress-induced ischemia     5.  Obesity with a body mass index of 38.  Patient has been counseled on weight reduction lifestyle modification and dietary restriction.  Patient has been explained the risk associated with obesity and the occurrence and progression of atherosclerotic coronary artery disease and peripheral vascular disease.     6.  History of arthritis and osteoporosis.  Patient is on Evista and Celebrex.     7.  Anxiety.  Patient is on diazepam 2.5 mg on a as needed basis.     8.  Risk factor modification.  Patient has been counseled on low-fat low-cholesterol diet and to undergo lipid profile check.  If the patient has elevated LDL patient would be started on a statin.     9.  Fatigue weakness obesity.  Patient has been recommended to follow-up with the primary care physician and would be evaluated for consideration for a sleep study to rule out obstructive sleep apnea.     The above plan of management were discussed with the patient.        Time: time spent in face-to-face evaluation of greater than 55 minutes and interacting and formulating examining and discussing the plan with the patient with 50% of greater time spent in face-to-face interaction.    Electronically signed by Enrrique Fletcher MD, 01/20/23, 7:39 AM CST.      Dictated utilizing Dragon dictation.

## 2023-01-26 RX ORDER — SODIUM CHLORIDE 0.9 % (FLUSH) 0.9 %
10 SYRINGE (ML) INJECTION EVERY 12 HOURS SCHEDULED
Status: DISCONTINUED | OUTPATIENT
Start: 2023-01-26 | End: 2023-01-26

## 2023-01-26 RX ORDER — SODIUM CHLORIDE 0.9 % (FLUSH) 0.9 %
10 SYRINGE (ML) INJECTION AS NEEDED
Status: DISCONTINUED | OUTPATIENT
Start: 2023-01-26 | End: 2023-01-28 | Stop reason: HOSPADM

## 2023-01-26 RX ORDER — SODIUM CHLORIDE 9 MG/ML
75 INJECTION, SOLUTION INTRAVENOUS CONTINUOUS
Status: DISCONTINUED | OUTPATIENT
Start: 2023-01-27 | End: 2023-01-28 | Stop reason: HOSPADM

## 2023-01-26 RX ORDER — SODIUM CHLORIDE 0.9 % (FLUSH) 0.9 %
10 SYRINGE (ML) INJECTION EVERY 12 HOURS SCHEDULED
Status: DISCONTINUED | OUTPATIENT
Start: 2023-01-27 | End: 2023-01-28 | Stop reason: HOSPADM

## 2023-01-27 ENCOUNTER — HOSPITAL ENCOUNTER (OUTPATIENT)
Dept: CARDIOLOGY | Facility: HOSPITAL | Age: 83
Discharge: HOME | End: 2023-01-27
Admitting: INTERNAL MEDICINE
Payer: MEDICARE

## 2023-01-27 VITALS
HEART RATE: 56 BPM | DIASTOLIC BLOOD PRESSURE: 72 MMHG | WEIGHT: 225.53 LBS | BODY MASS INDEX: 39.96 KG/M2 | SYSTOLIC BLOOD PRESSURE: 155 MMHG | TEMPERATURE: 97.8 F | RESPIRATION RATE: 20 BRPM | HEIGHT: 63 IN | OXYGEN SATURATION: 93 %

## 2023-01-27 DIAGNOSIS — I48.91 ATRIAL FIBRILLATION, UNSPECIFIED TYPE: ICD-10-CM

## 2023-01-27 LAB
ALBUMIN SERPL-MCNC: 3.9 G/DL (ref 3.5–5.2)
ALBUMIN/GLOB SERPL: 1.7 G/DL
ALP SERPL-CCNC: 64 U/L (ref 39–117)
ALT SERPL W P-5'-P-CCNC: 9 U/L (ref 1–33)
ANION GAP SERPL CALCULATED.3IONS-SCNC: 7 MMOL/L (ref 5–15)
AST SERPL-CCNC: 12 U/L (ref 1–32)
BASOPHILS # BLD AUTO: 0.03 10*3/MM3 (ref 0–0.2)
BASOPHILS NFR BLD AUTO: 0.8 % (ref 0–1.5)
BILIRUB SERPL-MCNC: 0.7 MG/DL (ref 0–1.2)
BUN SERPL-MCNC: 24 MG/DL (ref 8–23)
BUN/CREAT SERPL: 26.4 (ref 7–25)
CALCIUM SPEC-SCNC: 9.2 MG/DL (ref 8.6–10.5)
CHLORIDE SERPL-SCNC: 103 MMOL/L (ref 98–107)
CO2 SERPL-SCNC: 29 MMOL/L (ref 22–29)
CREAT SERPL-MCNC: 0.91 MG/DL (ref 0.57–1)
DEPRECATED RDW RBC AUTO: 43.2 FL (ref 37–54)
EGFRCR SERPLBLD CKD-EPI 2021: 63.9 ML/MIN/1.73
EOSINOPHIL # BLD AUTO: 0.09 10*3/MM3 (ref 0–0.4)
EOSINOPHIL NFR BLD AUTO: 2.3 % (ref 0.3–6.2)
ERYTHROCYTE [DISTWIDTH] IN BLOOD BY AUTOMATED COUNT: 12.9 % (ref 12.3–15.4)
GLOBULIN UR ELPH-MCNC: 2.3 GM/DL
GLUCOSE SERPL-MCNC: 105 MG/DL (ref 65–99)
HCT VFR BLD AUTO: 36.3 % (ref 34–46.6)
HGB BLD-MCNC: 12.4 G/DL (ref 12–15.9)
IMM GRANULOCYTES # BLD AUTO: 0.01 10*3/MM3 (ref 0–0.05)
IMM GRANULOCYTES NFR BLD AUTO: 0.3 % (ref 0–0.5)
INR PPP: 2.63 (ref 0.8–1.2)
LYMPHOCYTES # BLD AUTO: 0.93 10*3/MM3 (ref 0.7–3.1)
LYMPHOCYTES NFR BLD AUTO: 23.5 % (ref 19.6–45.3)
MAXIMAL PREDICTED HEART RATE: 140 BPM
MCH RBC QN AUTO: 31.6 PG (ref 26.6–33)
MCHC RBC AUTO-ENTMCNC: 34.2 G/DL (ref 31.5–35.7)
MCV RBC AUTO: 92.6 FL (ref 79–97)
MONOCYTES # BLD AUTO: 0.37 10*3/MM3 (ref 0.1–0.9)
MONOCYTES NFR BLD AUTO: 9.4 % (ref 5–12)
NEUTROPHILS NFR BLD AUTO: 2.52 10*3/MM3 (ref 1.7–7)
NEUTROPHILS NFR BLD AUTO: 63.7 % (ref 42.7–76)
NRBC BLD AUTO-RTO: 0 /100 WBC (ref 0–0.2)
PLATELET # BLD AUTO: 169 10*3/MM3 (ref 140–450)
PMV BLD AUTO: 10.6 FL (ref 6–12)
POTASSIUM SERPL-SCNC: 4.4 MMOL/L (ref 3.5–5.2)
PROT SERPL-MCNC: 6.2 G/DL (ref 6–8.5)
PROTHROMBIN TIME: 28.2 SECONDS (ref 11.1–15.3)
RBC # BLD AUTO: 3.92 10*6/MM3 (ref 3.77–5.28)
SODIUM SERPL-SCNC: 139 MMOL/L (ref 136–145)
STRESS TARGET HR: 119 BPM
WBC NRBC COR # BLD: 3.95 10*3/MM3 (ref 3.4–10.8)

## 2023-01-27 PROCEDURE — 99152 MOD SED SAME PHYS/QHP 5/>YRS: CPT

## 2023-01-27 PROCEDURE — 92960 CARDIOVERSION ELECTRIC EXT: CPT

## 2023-01-27 PROCEDURE — 25010000002 MIDAZOLAM PER 1 MG: Performed by: INTERNAL MEDICINE

## 2023-01-27 PROCEDURE — 93010 ELECTROCARDIOGRAM REPORT: CPT | Performed by: INTERNAL MEDICINE

## 2023-01-27 PROCEDURE — 25010000002 FENTANYL CITRATE (PF) 50 MCG/ML SOLUTION: Performed by: INTERNAL MEDICINE

## 2023-01-27 PROCEDURE — 85610 PROTHROMBIN TIME: CPT | Performed by: INTERNAL MEDICINE

## 2023-01-27 PROCEDURE — 99153 MOD SED SAME PHYS/QHP EA: CPT

## 2023-01-27 PROCEDURE — 85025 COMPLETE CBC W/AUTO DIFF WBC: CPT | Performed by: INTERNAL MEDICINE

## 2023-01-27 PROCEDURE — 80053 COMPREHEN METABOLIC PANEL: CPT | Performed by: INTERNAL MEDICINE

## 2023-01-27 PROCEDURE — 93005 ELECTROCARDIOGRAM TRACING: CPT | Performed by: INTERNAL MEDICINE

## 2023-01-27 RX ORDER — MIDAZOLAM HYDROCHLORIDE 1 MG/ML
INJECTION INTRAMUSCULAR; INTRAVENOUS
Status: COMPLETED | OUTPATIENT
Start: 2023-01-27 | End: 2023-01-27

## 2023-01-27 RX ORDER — FENTANYL CITRATE 50 UG/ML
INJECTION, SOLUTION INTRAMUSCULAR; INTRAVENOUS
Status: COMPLETED | OUTPATIENT
Start: 2023-01-27 | End: 2023-01-27

## 2023-01-27 RX ADMIN — FENTANYL CITRATE 50 MCG: 50 INJECTION INTRAMUSCULAR; INTRAVENOUS at 10:15

## 2023-01-27 RX ADMIN — SODIUM CHLORIDE 75 ML/HR: 9 INJECTION, SOLUTION INTRAVENOUS at 08:13

## 2023-01-27 RX ADMIN — MIDAZOLAM HYDROCHLORIDE 1 MG: 1 INJECTION, SOLUTION INTRAMUSCULAR; INTRAVENOUS at 10:11

## 2023-01-27 RX ADMIN — FENTANYL CITRATE 50 MCG: 50 INJECTION INTRAMUSCULAR; INTRAVENOUS at 10:11

## 2023-01-27 RX ADMIN — MIDAZOLAM HYDROCHLORIDE 1 MG: 1 INJECTION, SOLUTION INTRAMUSCULAR; INTRAVENOUS at 10:15

## 2023-01-27 NOTE — PROGRESS NOTES
Patient underwent successful electrical cardioversion with 300 J energy to normal sinus rhythm.  Patient has sinus bradycardia.  Patient was transferred to the floor in stable condition.

## 2023-01-27 NOTE — DISCHARGE INSTR - APPOINTMENTS
FOLLOW UP APPOINTMENT    MARCH 16,2023 11AM        HOLD COREG UNTIL TOMORROW    START AMIODARONE TOMORROW DECREASE THE DOSE TO 100MG

## 2023-01-27 NOTE — H&P
Cardiology History and Physical Note        Patient Name: Katie Ayoub  Age/Sex: 82 y.o. female  : 1940  MRN: [id number]    Date of Admission : 2023    Primary care Physician: Marcellus Feliz MD    Reason for Admission: Atrial fibrillation electrical cardioversion      Subjective:       Chief Complaint: Palpitation    History of Present Illness:  Katie Ayoub is a 82 y.o. female     Height of 5 feet 4 inches weight of 226 pounds with a body mass index of 38 with a past medical history significant for arterial hypertension, hypertensive heart disease, chronic fatigue, osteoporosis, obesity with a body mass index of 38, arthritis and family history for coronary artery disease.    Patient has had atrial fibrillation.  Patient was added on amiodarone and anticoagulation with Xarelto.  Patient had undergone a myocardial perfusion Lexiscan Cardiolite stress test was negative for stress-induced ischemia.    Patient at the present time has been recommended electrocardioversion.  Risk-benefit treatment after the electrical cardioversion were discussed the patient    Patient 10 point review of system except for stated in the history of present illness and negative.        Concurrent  Medical History:  1.  Abnormal resting electrocardiogram with inferolateral ST-T wave changes.  2.  Atypical chest pain with symptoms of palpitation chronic fatigue and symptoms of dizziness.  3.  Arterial hypertension.  4.  Hypertensive heart disease.  5.  Osteoporosis.  6.  Obesity with a body mass index of 38.  7.  Arthritis.        Past Surgical History:  1.  Cholecystectomy.  2.  Tubal ligation.  3.  Right total hip arthroplasty.  4.  Hemorrhoidectomy.  5.  Colonoscopy.        Family History: Family history significant for brother who has coronary artery disease.        Social History: No history of tobacco alcohol intake.        Cardiac Risk factor:   1.  Postmenopausal.  2.  Obesity.  3.  Arterial  hypertension.         Allergies:  No Known Allergies    Home Medication::  Prior to Admission medications    Medication Sig Start Date End Date Taking? Authorizing Provider   amiodarone (PACERONE) 200 MG tablet Take 200 mg by mouth 2 (Two) Times a Day. 1/16/23   Enedina Castillo MD   aspirin 81 MG EC tablet Take 81 mg by mouth Daily.    Enedina Castillo MD   carvedilol (COREG) 12.5 MG tablet Take 25 mg by mouth 2 (Two) Times a Day With Meals.    Enedina Castillo MD   celecoxib (CeleBREX) 200 MG capsule Take 200 mg by mouth Daily.    Enedina Castillo MD   diazePAM (VALIUM) 2 MG tablet Take 2 mg by mouth Every 6 (Six) Hours As Needed for Anxiety.    Enedina Castillo MD   furosemide (LASIX) 20 MG tablet Take 20 mg by mouth Daily.    Enedina Castillo MD   magnesium oxide (MAG-OX) 400 MG tablet Take 400 mg by mouth 2 (Two) Times a Day.    Enedina Castillo MD   raloxifene (EVISTA) 60 MG tablet Take 60 mg by mouth Daily.    Enedina Castillo MD   Xarelto 20 MG tablet Take 20 mg by mouth Daily. 12/20/22   Enedina Castillo MD         Review of Systems   Constitutional: Positive for fatigue. Negative for chills, fever and unexpected weight change.   HENT: Negative for hearing loss and nosebleeds.    Eyes: Negative for visual disturbance.   Respiratory: Positive for shortness of breath. Negative for cough, chest tightness and wheezing.    Cardiovascular: Positive for palpitations. Negative for chest pain and leg swelling.   Gastrointestinal: Negative for abdominal pain, blood in stool, constipation, diarrhea, nausea and vomiting.   Endocrine: Negative for cold intolerance, heat intolerance, polydipsia, polyphagia and polyuria.   Genitourinary: Negative for hematuria.   Musculoskeletal: Negative for joint swelling, myalgias and neck pain.   Skin: Negative for color change, rash and wound.   Neurological: Negative for dizziness, seizures, syncope, light-headedness, numbness and  headaches.   Hematological: Does not bruise/bleed easily.         Objective:     There were no vitals taken for this visit.   160/100 pulse of 69 respiration of 18 oxygen saturation of 97%.  Constitutional:       Appearance: Well-developed.   Eyes:      General: No scleral icterus.     Conjunctiva/sclera: Conjunctivae normal.      Pupils: Pupils are equal, round, and reactive to light.   HENT:      Head: Normocephalic and atraumatic.      Left Ear: External ear normal.      Nose: Nose normal.   Neck:      Thyroid: No thyromegaly.      Vascular: No JVD.      Trachea: No tracheal deviation.      Lymphadenopathy: No cervical adenopathy.   Pulmonary:      Breath sounds: Normal breath sounds. No stridor.   Cardiovascular:      Normal rate. Irregularly irregular rhythm.   Abdominal:      General: Bowel sounds are normal.   Musculoskeletal: Normal range of motion.      Cervical back: Normal range of motion and neck supple. Skin:     General: Skin is warm and dry.   Neurological:      Mental Status: Alert and oriented to person, place, and time.      Deep Tendon Reflexes: Reflexes are normal and symmetric.   Psychiatric:         Behavior: Behavior normal.         Thought Content: Thought content normal.         Judgment: Judgment normal.         Lab Review:     Results from last 7 days   Lab Units 01/20/23  0832   SODIUM mmol/L 139   POTASSIUM mmol/L 4.1   CHLORIDE mmol/L 102   CO2 mmol/L 28.0   BUN mg/dL 27*   CREATININE mg/dL 0.96   CALCIUM mg/dL 8.9   BILIRUBIN mg/dL 0.7   ALK PHOS U/L 67   ALT (SGPT) U/L 13   AST (SGOT) U/L 15   GLUCOSE mg/dL 101*             Results from last 7 days   Lab Units 01/20/23  0832   WBC 10*3/mm3 3.88   HEMOGLOBIN g/dL 12.6   HEMATOCRIT % 38.4   PLATELETS 10*3/mm3 182     Results from last 7 days   Lab Units 01/20/23  0832   APTT seconds 23.6                       EKG:   ECG/EMG Results (last 24 hours)     ** No results found for the last 24 hours. **          Imaging:  Imaging Results (Last  24 Hours)     ** No results found for the last 24 hours. **          I personally viewed and interpreted the patient's EKG/Telemetry data.    Assessment:   1.  Atrial fibrillation.  2.  Abnormal resting electrocardiogram with inferolateral ST-T wave changes suggestive of ischemia.  3.  Arterial hypertension.  4.  Hypertensive heart disease.  5.  Obesity.  6.  Arthritis.          Plan:   1.  Atrial fibrillation.  Patient has been in atrial fibrillation and anticoagulation with Xarelto.  Patient has been recommended to undergo electrical conversion.  Benefit treatment option for the electrocardioversion were discussed with the patient and informed consent was obtained.  Patient partners: #2 patient is extrication #2.  Risk for minimal sedation was also discussed with the patient.  Plan was to proceed with electrical cardioversion.       2.  Shortness of breath.  Patient has been counseled to decrease her salt intake.  Patient would be continued on the present dose of the carvedilol 12.5 mg twice a day and would be started on a low-dose of Lasix 20 mg daily.     3.  Arterial hypertension.  Patient blood pressure has been labile and elevated.  Patient has been counseled to decrease her salt intake and to have frequent blood pressure evaluation at home.    4.  Patient had undergone myocardial perfusion Lexiscan cardiac stress test with did not reveal evidence of any stress-induced ischemia    5.  Obesity with a body mass index of 38.  Patient has been counseled on weight reduction lifestyle modification and dietary restriction.  Patient has been explained the risk associated with obesity and the occurrence and progression of atherosclerotic coronary artery disease and peripheral vascular disease.     6.  History of arthritis and osteoporosis.  Patient is on Evista and Celebrex.     7.  Anxiety.  Patient is on diazepam 2.5 mg on a as needed basis.     8.  Risk factor modification.  Patient has been counseled on low-fat  low-cholesterol diet and to undergo lipid profile check.  If the patient has elevated LDL patient would be started on a statin.     9.  Fatigue weakness obesity.  Patient has been recommended to follow-up with the primary care physician and would be evaluated for consideration for a sleep study to rule out obstructive sleep apnea.     The above plan of management were discussed with the patient.             Time: time spent in face-to-face evaluation of greater than 55 minutes and interacting and formulating examining and discussing the plan with the patient with 50% of greater time spent in face-to-face interaction.    Electronically signed by Enrrique Fletcher MD, 01/27/23, 6:57 AM CST.      Dictated utilizing Dragon dictation.

## 2023-02-02 LAB
QT INTERVAL: 444 MS
QTC INTERVAL: 396 MS

## 2023-02-09 LAB
MAXIMAL PREDICTED HEART RATE: 140 BPM
STRESS TARGET HR: 119 BPM